# Patient Record
(demographics unavailable — no encounter records)

---

## 2017-01-01 NOTE — PN
Interval History: 





Fussy overnight.  Has been able to latch.  


Method of Feeding: Breast feeding


Feeding Frequency: Ad Gabi


Stool Passed: Yes


Stools in Past 24 Hours: 3


Voiding: Yes


Times Voided in Past 24 Hours: 6





Measurements


Current Weight: 6 lb 9.116 oz


Weight in lbs and ozs: 6 lbs and 9 oz


Weight Yesterday: 6 lb 12.891 oz


Weight Gain/Loss Since Last Weight In Grams: 107.0 Loss


Birth Weight: 6 lb 12.891 oz


Birthweight in lbs and ozs: 6 lbs and 13 oz


% Weight Gain/Loss from Birth Weight: 3% Loss


Length: 19 in


Head Circumference in inches: 12.75


Abdominal Girth in cm: 30


Abdominal Girth in inches: 11.811





Vitals


Vital Signs: 


 Vital Signs











  17





  11:57 16:00 21:12


 


Temperature 98.9 F 98.7 F 98.2 F


 


Pulse Rate 138 142 140


 


Respiratory 44 48 32





Rate   


 


O2 Sat by Pulse   





Oximetry   














  17





  00:11 00:35 04:29


 


Temperature 97.9 F 98.1 F 98.6 F


 


Pulse Rate 126 144 142


 


Respiratory 40 36 48





Rate   


 


O2 Sat by Pulse  99 





Oximetry   














  17





  06:59


 


Temperature 98.8 F


 


Pulse Rate 144


 


Respiratory 42





Rate 


 


O2 Sat by Pulse 





Oximetry 














 Physical Exam


General Appearance: Alert, Active


Skin Color: Normal


Level of Distress: No Distress


Neck: Normal Tone


Respiratory Effort: Normal


Respiratory Rate: Normal


Auscultation: Bilateral Good Air Exchange


Breath Sounds: NL Both Lungs


Rhythm: Regular


Abnormal Heart Sounds: No Murmurs, No S3, No S4


Umbilicus Assessment: Yes Normal


Abdomen: Normal


Abdomen Palpation: Liver Normal, Spleen Normal


Clavicles: Normal


Left Hip: Normal ROM


Right Hip: Normal ROM


Skin Texture: Smooth, Soft


Skin Appearance: No Abnormalities


Neuro: Normal: Miguel Angel, Sucking, Muscle Tone


Cranial Nerve Exam: Cranial N. II-XII Normal





Medications


Home Medications: 


 Home Medications











 Medication  Instructions  Recorded  Confirmed  Type


 


NK [No Home Medications Reported]  17 History











Inpatient Medications: 


 Medications





Dextrose (Glutose Oral Nicu*)  0 ml BUCCAL .SEE MD INSTRUCTIONS PRN; Protocol


   PRN Reason: ASYMTOMATIC HYPOGLYCEMIA











Results/Investigations


Transcutaneous Bilirubin Result: 5.4


Time Obtained: :58


Age in Hours: 31


Risk Zone: Low Risk


Lab Results: 


 











  17





  23:20


 


RPR  Nonreactive











Condition: Stable


Assessment: 





Term AGA female.  First time breastfeeding mom.  Vital signs stable and within 

normal limits.  Exam normal.  TcB=5.4 at 31 hours = low risk zone.  Passed CCHD 

and hearing.  Hep B given.  [place] screen done.  Likely discharge tomorrow.


Provided Guidance to: Mother


Guidance and Instruction: signs of illness, feeding schedule/plan

## 2017-01-01 NOTE — PN
Interval History: 


 Intake and Output











 11/15/17 11/15/17 11/15/17 11/15/17





 06:59 07:59 08:59 09:59


 


Weight    6 lb 8.411 oz








Method of Feeding: Breast feeding


Feeding Frequency: Ad Gabi


Feeding Status: Without Difficulty


Maternal Nipple Condition: Left Painful - mild discomfort/stinging on left


Stool Passed: Yes


Voiding: Yes





Measurements


Current Weight: 6 lb 8.411 oz


Weight in lbs and ozs: 6 lbs and 8 oz


Weight Yesterday: 6 lb 9.116 oz


Weight Gain/Loss Since Last Weight In Grams: 20.0 Loss


Birth Weight: 6 lb 12.891 oz


Birthweight in lbs and ozs: 6 lbs and 13 oz


% Weight Gain/Loss from Birth Weight: 4% Loss


Length: 19 in


Head Circumference in inches: 12.75


Abdominal Girth in cm: 30


Abdominal Girth in inches: 11.811





Vitals


Vital Signs: 


 Vital Signs











  17





  12:25 15:48 19:45


 


Temperature 98.4 F 98.3 F 98.2 F


 


Pulse Rate 146 149 144


 


Respiratory 44 39 40





Rate   














  11/15/17 11/15/17 11/15/17





  00:20 04:43 08:35


 


Temperature 98.8 F 98.1 F 98.1 F


 


Pulse Rate 134 132 124


 


Respiratory 54 50 48





Rate   














Medications


Home Medications: 


 Home Medications











 Medication  Instructions  Recorded  Confirmed  Type


 


NK [No Home Medications Reported]  17 History











Inpatient Medications: 


 Medications





Dextrose (Glutose Oral Nicu*)  0 ml BUCCAL .SEE MD INSTRUCTIONS PRN; Protocol


   PRN Reason: ASYMTOMATIC HYPOGLYCEMIA











Results/Investigations


Transcutaneous Bilirubin Result: 5.4


Time Obtained: 06:58


Age in Hours: 32


Risk Zone: Low Risk


Major Jaundice Risk Factors: None


Minor Jaundice Risk Factors: Breastfeeding, Mother > 24 yrs old


Decreased Jaundice Risk: Bili in low risk zone


CCHD Screen: Passed


Lab Results: 


 











  17





  23:20


 


RPR  Nonreactive











Assessment: 





Lactation Note:





Now 3 day old FT AGA infant born 17 at 2320 via  to a 29 yo  -1 

mother who is A+.  Apgars 9,9, negative prenatal labs, negative GBS. Maternal 

history of depression, not on medication but in counseling. infant at 4% weight 

loss, voiding and stooling well. 





Mother feels that infant has been going to the breast very well, minimal 

pinching noted, but a slight stinging on the left nipple today. Infant to 

breast easily in cross cradle position with mother semi-reclined. Infant 

latches deeply and we reviewed tips for positioning including infant's ear/

shoulder/hip alignment with belly rotated in towards mother. Disc. benefits of 

breast massage to keep infant suckling vigorously. Also demonstrated how to 

pull the chin and to flange the lips to ensure a deeper latch. Encouraged skin 

to skin, and also disc. feeding cues. 





Will follow up in the office 17 at 1415 with DUNIA Forde, 

IBCLC.

## 2017-01-01 NOTE — DS
Prenatal Information: 





 Previous Pregnancy/Births











Maternal Age                   30


 


Grav                           2


 


Para                           0


 


SAB                            1


 


IEA                            0


 


LC                             0


 


Maternal Blood Type and Rh     A Positive








 Testing Needs/Results











Gestational Age   39 Weeks and 4 Days


 


Determined By                  LMP


 


Violence or Abuse During this  No





Pregnancy                      


 


Feeding Plan                   Breast


 


Planned Infant Care Provider   on call





Post-Discharge                 


 


Serology/RPR Result            Non-Reactive


 


Rubella Result                 Immune


 


HBsAg Result                   Negative


 


HIV Result                     Negative


 


GBS Culture Result             Negative











 Significant Medical History











Hx Depression                  Yes: counseling








 Tobacco/Alcohol/Substance Use











Smoking Status (MU)            Never Smoked Tobacco


 


Household Exposure             No


 


Alcohol Use                    None


 


Substance Use Type             None








 Delivery Information/Events of Note











Date of Birth [A]              17


 


Time of Birth [A]              23:20


 


Delivery Method [A]            Spontaneous Vaginal


 


Amniotic Fluid [A]             Clear


 


Anesthesia/Analgesia [A]       CEI for Labor


 


Level of Nursery               Regular/Bedside


 


Delivery Events of Note        Pitocin Only After Delive,Post-Partum Bleeding


 


Delivery Events of Note        trailing membranes

















Delivery Events


Date of Birth: 17


Time of Birth: 23:20


Apgar Score 1 Minute: 9


Apgar Score 5 Minutes: 9


Gestational Age Weeks: 39


Gestational Age Days: 6


Delivery Type: Vaginal


Amniotic Fluid: Clear


Intrapartal Antibiotics Indicated: None Apply


Other GBS Status Detail: GBS Negative This Pregnancy


ROM Length: ROM < 18 Hours


Hepatitis B Vaccine: Given Within 12 Hours


 Drug Withdrawal Risk: None Apply


Hepatitis B Status/Risk: Mother HBsAg NEGATIVE With No New Risk Factors


Maternal Consent: Mother CONSENTS To Infant Hepatitis Vaccine +/- HBIG


Method of Feeding: Breast feeding


Feeding Frequency: Ad Gabi


Feeding Status: Without Difficulty


Maternal Nipple Condition: Left Painful


Stool Passed: Yes


Stools in Past 24 Hours: 3


Voiding: Yes


Times Voided in Past 24 Hours: 4





Measurements


Current Weight: 6 lb 8.411 oz


Weight in lbs and ozs: 6 lbs and 8 oz


Weight Yesterday: 6 lb 9.116 oz


Weight Gain/Loss Since Last Weight In Grams: 20.0 Loss


Birth Weight: 6 lb 12.891 oz


Birthweight in lbs and ozs: 6 lbs and 13 oz


% Weight Gain/Loss from Birth Weight: 4% Loss


Length: 19 in


Head Circumference in inches: 12.75


Abdominal Girth in cm: 30


Abdominal Girth in inches: 11.811





Vitals


Vital Signs: 


 Vital Signs











  17





  12:25 15:48 19:45


 


Temperature 98.4 F 98.3 F 98.2 F


 


Pulse Rate 146 149 144


 


Respiratory 44 39 40





Rate   














  11/15/17 11/15/17





  00:20 04:43


 


Temperature 98.8 F 98.1 F


 


Pulse Rate 134 132


 


Respiratory 54 50





Rate  














 Physical Exam


General Appearance: Alert, Active


Skin Color: Normal


Level of Distress: No Distress


Neck: Normal Tone


Respiratory Effort: Normal


Respiratory Rate: Normal


Auscultation: Bilateral Good Air Exchange


Breath Sounds: NL Both Lungs


Rhythm: Regular


Abnormal Heart Sounds: No Murmurs, No S3, No S4


Umbilicus Assessment: Yes Normal


Abdomen: Normal


Abdomen Palpation: Liver Normal, Spleen Normal


Clavicles: Normal


Left Hip: Normal ROM


Right Hip: Normal ROM


Skin Texture: Smooth, Soft


Skin Appearance: No Abnormalities


Neuro: Normal: Buck[place], Sucking, Muscle Tone


Cranial Nerve Exam: Cranial N. II-XII Normal





Medications


Home Medications: 


 Home Medications











 Medication  Instructions  Recorded  Confirmed  Type


 


NK [No Home Medications Reported]  17 History











Inpatient Medications: 


 Medications





Dextrose (Glutose Oral Nicu*)  0 ml BUCCAL .SEE MD INSTRUCTIONS PRN; Protocol


   PRN Reason: ASYMTOMATIC HYPOGLYCEMIA











Results/Investigations


Transcutaneous Bilirubin Result: 5.4


Time Obtained: 06:58


Age in Hours: 32


Risk Zone: Low Risk


Major Jaundice Risk Factors: None


Minor Jaundice Risk Factors: Breastfeeding, Mother > 24 yrs old


Decreased Jaundice Risk: Bili in low risk zone


CCHD Screen: Passed


Lab Results: 


 











  17





  23:20


 


RPR  Nonreactive














Hospital Course


Hearing Screen: Passed Both


Left Ear: Passed, TEOAE


Right Ear: Passed, TEOAE


Hepatitis B Vaccine: Given Within 12 Hours


Date Given: 17


[facility] Screening: Done





Assessment





- Assessment


Condition at Discharge: Stable


Discharge Disposition: Home


Assessment Comments: 





3 day old FT AGA female infant born to a 31 y/o ->1 A+/GBS-/PNL- mother via 

 at 39 6/7 wks. Baby is breast feeding ad gabi. Voiding and stooling well. 

Weight down 4% from BW. TC bili 5.4 at 31 hrs of life = low risk. Passed CCHD 

and hearing screenings. Hep B given. Normal exam. 





Plan





- Follow Up Care


Follow Up Care Provider: [facility]


Follow up date: 17


Appointment Status: Scheduled





- Anticipatory Guidance/Instruction


Provided Guidance to: Mother, Father


Guidance and Instruction: signs of illness, feeding schedule/plan, signs of 

jaundice, contact physician on call, sleeping position, umbilicus care, limit 

exposure to others

## 2017-01-01 NOTE — HP
Information from Mother's Record: 





 Previous Pregnancy/Births











Maternal Age                   30


 


Grav                           2


 


Para                           0


 


SAB                            1


 


IEA                            0


 


LC                             0


 


Maternal Blood Type and Rh     A Positive








 Testing Needs/Results











Gestational Age   39 Weeks and 4 Days


 


Determined By                  LMP


 


Violence or Abuse During this  No





Pregnancy                      


 


Feeding Plan                   Breast


 


Planned Infant Care Provider   on call





Post-Discharge                 


 


Serology/RPR Result            Non-Reactive


 


Rubella Result                 Immune


 


HBsAg Result                   Negative


 


HIV Result                     Negative


 


GBS Culture Result             Negative











 Significant Medical History











Hx Depression                  Yes: counseling








 Tobacco/Alcohol/Substance Use











Smoking Status (MU)            Never Smoked Tobacco


 


Household Exposure             No


 


Alcohol Use                    None


 


Substance Use Type             None








 Delivery Information/Events of Note











Date of Birth [A]              17


 


Time of Birth [A]              23:20


 


Delivery Method [A]            Spontaneous Vaginal


 


Amniotic Fluid [A]             Clear


 


Anesthesia/Analgesia [A]       CEI for Labor


 


Level of Nursery               Regular/Bedside


 


Delivery Events of Note        Pitocin Only After Delive,Post-Partum Bleeding


 


Delivery Events of Note        trailing membranes

















Delivery Events


Date of Birth: 17


Time of Birth: 23:20


Apgar Score 1 Minute: 9


Apgar Score 5 Minutes: 9


Gestational Age Weeks: 39


Gestational Age Days: 6


Delivery Type: Vaginal


Amniotic Fluid: Clear


Intrapartal Antibiotics Indicated: None Apply


Other GBS Status Detail: GBS Negative This Pregnancy


ROM Length: ROM < 18 Hours


Hepatitis B Vaccine: Given Within 12 Hours


 Drug Withdrawal Risk: None Apply


Hepatitis B Status/Risk: Mother HBsAg NEGATIVE With No New Risk Factors


Maternal Consent: Mother CONSENTS To Infant Hepatitis Vaccine +/- HBIG





Hypoglycemia Assessment


Hypoglycemia Risk - High: None


Hypoglycemia Symptoms: None





Nutrition and Output





- Nutrition


Method of Feeding: Breast feeding


Nutrition Description: 





Well so far, no nipple discomfort with first few feedings





- Stool


Stool Passed: Yes





- Voiding


Voiding: No





Measurements


Current Weight: 3.087 kg


Birthweight in lbs and ozs: 6 lbs and 13 oz


Length: 48.26 cm


Head Circumference in inches: 12.75


Abdominal Girth in cm: 30


Abdominal Girth in inches: 11.811





Vitals


Vital Signs: 











  17





  00:05 00:30 01:00


 


Temperature 98.7 F 98.7 F 98.1 F


 


Pulse Rate 156 154 134


 


Respiratory 48 50 56





Rate   














  17





  02:05 03:00 07:45


 


Temperature 98.4 F 98.6 F 98.4 F


 


Pulse Rate 134 142 146


 


Respiratory 48 48 48





Rate   














[place] Physical Exam


General Appearance: Alert, Active


Skin Color: Normal


Level of Distress: No Distress


Nutritional Status: AGA


Cranial Features: Normal head shape, Symmetric facial features, Normal 

fontanelles


Eyes: Bilateral Normal, Bilateral Red Reflex


Ears: Symmetrical, Normal Position, Canals Patent


Oropharynx: Normal: Lips, Mouth, Gums, Uvula


Neck: Normal Tone


Respiratory Effort: Normal


Respiratory Rate: Normal


Chest Appearance: Normal, Areola Breast 3-4 mm Size, Symmetrical


Auscultation: Bilateral Good Air Exchange


Breath Sounds: NL Both Lungs


Location of Apical Pulse: Normal


Rhythm: Regular


Heart Sounds: Normal: S1, S2


Abnormal Heart Sounds: No Murmurs, No S3, No S4


Brachial Pulses: Bilateral Normal


Femoral Pulses: Bilateral Normal


Umbilicus Assessment: Yes Normal


Abdomen: Normal


Abdomen Palpation: Liver Normal, Spleen Normal


Hernia: None


Anus: Patent


Location of Anus: Normal


Genital Appearance: Female


Enlarged Nodes: None


External Genitalia: Normal: Labia, Clitoris, Introitus


Urethral Meatus: Normal


Vagina: Normal for Gestational Age


Clavicles: Normal


Arms: 2 Symmetrical Extremities, Full Range of Motion


Hands: 2 Hands, Symmetrical, 5 Fingers on Each Hand, Full Range of Motion


Left Hip: Normal ROM


Right Hip: Normal ROM


Legs: 2 Symmetrical Extremities, Full Range of Motion


Feet: 2 Feet, Symmetrical, Creases on 2/3 of Soles, Full Range of Motion


Spine: Normal


Skin Texture: Smooth, Soft


Skin Appearance: No Abnormalities


Neuro: Normal: Miguel Angel, Sucking, Muscle Tone


Cranial Nerve Exam: Cranial N. II-XII Normal


Deep Tendon Reflexes: Normal: Bicep, Knee, Ankle





Medications


Home Medications: 


 Home Medications











 Medication  Instructions  Recorded  Confirmed  Type


 


NK [No Home Medications Reported]  17 History











Inpatient Medications: 


 Medications





Dextrose (Glutose Oral Nicu*)  0 ml BUCCAL .SEE MD INSTRUCTIONS PRN; Protocol


   PRN Reason: ASYMTOMATIC HYPOGLYCEMIA











Assessment





- Status


Status: Full-term, AGA


Condition: Stable


Assessment: 





Healthy , no maternal risk factors.





Plan of Care


[place] Admission to: [place] [facility]


Provided Guidance to: Mother, Father


Guidance and Instruction: signs of illness, feeding schedule/plan, signs of 

jaundice, safety in home, contact physician on call, umbilicus care, limit 

exposure to others